# Patient Record
(demographics unavailable — no encounter records)

---

## 2018-02-01 NOTE — PCM.PREANE
Preanesthetic Assessment





- Anesthesia/Transfusion/Family Hx


Anesthesia History: Prior Anesthesia Without Reaction


Family History of Anesthesia Reaction: No (nausea after T&A as a child)


Transfusion History: No Prior Transfusion(s)





- Review of Systems


General: No Symptoms


Pulmonary: No Symptoms


Cardiovascular: No Symptoms


Gastrointestinal: No Symptoms


Neurological: No Symptoms


Other: Reports: None





- Physical Assessment


NPO Status Date: 01/31/18


NPO Status Time: 23:00


O2 Sat by Pulse Oximetry: 97


Respiratory Rate: 16


Vital Signs: 





 Last Vital Signs











Temp  37.5 C   02/01/18 08:09


 


Pulse  84   02/01/18 08:09


 


Resp  16   02/01/18 08:09


 


BP  116/72   02/01/18 08:09


 


Pulse Ox  97   02/01/18 08:09











Height: 1.65 m


Weight: 76.204 kg


ASA Class: 2


Mental Status: Alert & Oriented x3


Airway Class: Mallampati = 1


Dentition: Reports: Normal Dentition


ROM/Head Extension: Full


Lungs: Clear to Auscultation, Normal Respiratory Effort


Cardiovascular: Regular Rate, Regular Rhythm





- Lab


Values: 





 Laboratory Last Values











WBC  7.62 K/uL (4.0-11.0)   01/31/18  09:28    


 


RBC  4.51 M/uL (4.30-5.90)   01/31/18  09:28    


 


Hgb  13.8 g/dL (12.0-16.0)   01/31/18  09:28    


 


Hct  40.3 % (36.0-46.0)   01/31/18  09:28    


 


MCV  89.4 fL (80.0-98.0)   01/31/18  09:28    


 


MCH  30.6 pg (27.0-32.0)   01/31/18  09:28    


 


MCHC  34.2 g/dL (31.0-37.0)   01/31/18  09:28    


 


RDW Std Deviation  43.2 fl (28.0-62.0)   01/31/18  09:28    


 


RDW Coeff of Clau  13 % (11.0-15.0)   01/31/18  09:28    


 


Plt Count  239 K/uL (150-400)   01/31/18  09:28    


 


MPV  10.10 fL (7.40-12.00)   01/31/18  09:28    


 


Nucleated RBC %  0.0 /100WBC  01/31/18  09:28    


 


Nucleated RBCs #  0 K/uL  01/31/18  09:28    


 


Sodium  139 mmol/L (136-146)   01/31/18  09:28    


 


Potassium  4.0 mmol/L (3.5-5.1)   01/31/18  09:28    


 


Chloride  105 mmol/L ()   01/31/18  09:28    


 


Carbon Dioxide  26 mmol/L (21-31)   01/31/18  09:28    


 


BUN  10 mg/dL (6.0-23.0)   01/31/18  09:28    


 


Creatinine  0.7 mg/dL (0.6-1.5)   01/31/18  09:28    


 


Est Cr Clr Drug Dosing  113.43 mL/min  01/31/18  09:28    


 


Estimated GFR (MDRD)  > 60.0 ml/min  01/31/18  09:28    


 


Glucose  94 mg/dL ()   01/31/18  09:28    


 


Calcium  9.3 mg/dL (8.8-10.8)   01/31/18  09:28    


 


HCG, Qual  NEGATIVE  (NEG)   01/31/18  09:28    


 


Blood Type  O POSITIVE   01/31/18  09:28    


 


Antibody Screen  NEGATIVE   01/31/18  09:28    














- Allergies


Allergies/Adverse Reactions: 


 Allergies











Allergy/AdvReac Type Severity Reaction Status Date / Time


 


Penicillins Allergy  throat Verified 01/29/18 13:17





   closes  














- Acknowledgements


Anesthesia Type Planned: General Anesthesia


Pt an Appropriate Candidate for the Planned Anesthesia: Yes


Alternatives and Risks of Anesthesia Discussed w Pt/Guardian: Yes


Pt/Guardian Understands and Agrees with Anesthesia Plan: Yes





PreAnesthesia Questionnaire


HEENT History: Reports: Other (See Below)


Other HEENT History: wears glasses/contacts


Musculoskeletal History: Reports: Fracture


Other Musculoskeletal History: hx fx toes, fingers, wrist, and nose


Neurological History: Reports: Migraines


Psychiatric History: Reports: Anxiety, Depression





- Past Surgical History


Head Surgeries/Procedures: Reports: None


HEENT Surgical History: Reports: Oral Surgery, Tonsillectomy





- SUBSTANCE USE


Smoking Status *Q: Never Smoker


Recreational Drug Use History: No





- HOME MEDS


Home Medications: 


 Home Meds





FLUoxetine HCl [Prozac] 1 tab PO DAILY 01/29/18 [History]


Iron 45 mg PO DAILY 01/29/18 [History]











- CURRENT (IN HOUSE) MEDS


Current Meds: 





 Current Medications





Fentanyl (Sublimaze)  50 mcg IVPUSH Q5M PRN


   PRN Reason: Pain (severe 7-10)


   Stop: 02/02/18 07:47


Lactated Ringer's (Ringers, Lactated)  1,000 mls @ 125 mls/hr IV ASDIRECTED AINSLEY


   Last Admin: 02/01/18 08:11 Dose:  125 mls/hr


Sodium Chloride (Saline Flush)  10 ml FLUSH ASDIRECTED PRN


   PRN Reason: Keep Vein Open


Sodium Chloride (Saline Flush)  2.5 ml FLUSH ASDIRECTED PRN


   PRN Reason: Keep Vein Open





Discontinued Medications





Fentanyl (Sublimaze) Confirm Administered Dose 100 mcg .ROUTE .STK-MED ONE


   Stop: 02/01/18 07:35


Lidocaine (Xylocaine-Mpf 2%) Confirm Administered Dose 5 ml .ROUTE .STK-MED ONE


   Stop: 02/01/18 07:35


Midazolam HCl (Versed 1 Mg/Ml) Confirm Administered Dose 2 mg .ROUTE .STK-MED 

ONE


   Stop: 02/01/18 07:35


Ondansetron HCl (Zofran) Confirm Administered Dose 4 mg .ROUTE .STK-MED ONE


   Stop: 02/01/18 07:35


Propofol (Diprivan  20 Ml) Confirm Administered Dose 200 mg .ROUTE .STK-MED ONE


   Stop: 02/01/18 07:35

## 2018-02-01 NOTE — PCM.OPNOTE
- General Post-Op/Procedure Note


Date of Surgery/Procedure: 02/01/18


Operative Procedure(s): Dignostic Hystroscopy removal of iud


Pre Op Diagnosis: Lost IUD


Post-Op Diagnosis: Same


Anesthesia Technique: General Mask


Primary Surgeon: Damian Montes


Assistant: Kimberly Davis


EBL in mLs: 10


Complications: None


Condition: Good

## 2018-02-01 NOTE — OR
SURGEON:

Damian Montes MD

 

DATE OF PROCEDURE:

 

PREOPERATIVE DIAGNOSES:

Lost Mirena IUD.

 

POSTOPERATIVE DIAGNOSIS:

Lost Mirena IUD.

 

OPERATIONS PERFORMED:

Diagnostic hysteroscopy, removal of Mirena IUD.

 

ASSISTANT:

KIRBY Del Rosario

 

ANESTHESIA:

General mask, Mr. José Miguel Ayon.

 

ESTIMATED BLOOD LOSS:

Less than 10 mL.

 

COMPLICATIONS:

None.

 

FINDINGS:

An IUD in the uterus.

 

INDICATION FOR SURGERY:

This patient is 22, she had Mirena IUD.  She wanted it to be removed.  The

strings were lost.  She has not felt the strings in three years and I attempted

to remove within the office, it was not successful, so the patient is admitted

for diagnostic hysteroscopy and removal of the IUD.

 

PROCEDURE IN DETAIL:

The patient was brought to the OR and after adequate level of mask anesthesia,

the patient was dilated to accommodate a small hysteroscope and then

hysteroscopy was performed.  The endometrial cavity was essentially normal, and

the IUD is seen visible in the endometrial cavity with the strings wrapped

around the body of the IUD, so with using grasper through the hysteroscope, the

IUD is grasped and removed without any problem.  Once we did that, then we ended

the procedure.  Instrument and sponge count was correct.  The patient tolerated

the procedure well, went to recovery room in stable general condition.

 

 

ANATOLY / CLINT

DD:  02/01/2018 10:23:36

DT:  02/01/2018 17:12:43

Job #:  [id number]

## 2018-02-01 NOTE — PCM.DCSUM1
**Discharge Summary





- Discharge Data


Discharge Date: 02/01/18


Discharge Disposition: Home, Self-Care 01


Condition: Good





- Patient Summary/Data


Operative Procedure(s) Performed: Dignostic Hystroscopy removal of iud





- Patient Instructions


Diet: Usual Diet as Tolerated


Activity: As Tolerated


Driving: Do Not Drive


Showering/Bathing: May Shower





- Discharge Plan


Home Medications: 


 Home Meds





FLUoxetine HCl [Prozac] 1 tab PO DAILY 01/29/18 [History]


Iron 45 mg PO DAILY 01/29/18 [History]











- General Info


Date of Service: 02/01/18


Functional Status: Reports: Pain Controlled





- Review of Systems


General: Reports: No Symptoms


HEENT: Reports: No Symptoms


Pulmonary: Reports: No Symptoms


Cardiovascular: Reports: No Symptoms


Gastrointestinal: Reports: No Symptoms


Genitourinary: Reports: No Symptoms


Musculoskeletal: Reports: No Symptoms


Skin: Reports: No Symptoms


Neurological: Reports: No Symptoms


Psychiatric: Reports: No Symptoms





- Patient Data


Vitals - Most Recent: 


 Last Vital Signs











Temp  37.5 C   02/01/18 08:09


 


Pulse  84   02/01/18 08:09


 


Resp  16   02/01/18 08:41


 


BP  116/72   02/01/18 08:09


 


Pulse Ox  97   02/01/18 08:41











Weight - Most Recent: 76.204 kg


Lab Results - Last 24 hrs: 


 Laboratory Results - last 24 hr











  01/31/18 Range/Units





  09:28 


 


Blood Type  O POSITIVE  


 


Antibody Screen  NEGATIVE  











Med Orders - Current: 


 Current Medications





Fentanyl (Sublimaze)  50 mcg IVPUSH Q5M PRN


   PRN Reason: Pain (severe 7-10)


   Stop: 02/02/18 07:47


Lactated Ringer's (Ringers, Lactated)  1,000 mls @ 125 mls/hr IV ASDIRECTED AINSLEY


   Last Admin: 02/01/18 08:11 Dose:  125 mls/hr


Ketorolac Tromethamine (Toradol)  30 mg IVPUSH ONETIME ONE


   Stop: 02/01/18 10:16


Ketorolac Tromethamine (Toradol)  30 mg IVPUSH Q6H PRN


   PRN Reason: Pain (severe 7-10)


   Stop: 02/06/18 10:15


Morphine Sulfate (Morphine)  2 mg IVPUSH Q2H PRN


   PRN Reason: Pain (severe 7-10)


Morphine Sulfate (Morphine)  4 mg IVPUSH Q2H PRN


   PRN Reason: Pain (severe 7-10)


Ondansetron HCl (Zofran)  4 mg IVPUSH Q6H PRN


   PRN Reason: Nausea/Vomiting


Oxycodone/Acetaminophen (Percocet 325-5 Mg)  1 tab PO Q4H PRN


   PRN Reason: Pain (moderate 4-6)


Oxycodone/Acetaminophen (Percocet 325-5 Mg)  2 tab PO Q4H PRN


   PRN Reason: Pain (moderate 4-6)


Promethazine HCl (Phenergan)  25 mg IM Q6H PRN


   PRN Reason: Nausea/Vomiting


Sodium Chloride (Saline Flush)  10 ml FLUSH ASDIRECTED PRN


   PRN Reason: Keep Vein Open


Sodium Chloride (Saline Flush)  2.5 ml FLUSH ASDIRECTED PRN


   PRN Reason: Keep Vein Open





Discontinued Medications





Fentanyl (Sublimaze) Confirm Administered Dose 100 mcg .ROUTE .STK-MED ONE


   Stop: 02/01/18 07:35


Lidocaine (Xylocaine-Mpf 2%) Confirm Administered Dose 5 ml .ROUTE .STK-MED ONE


   Stop: 02/01/18 07:35


Midazolam HCl (Versed 1 Mg/Ml) Confirm Administered Dose 2 mg .ROUTE .STK-MED 

ONE


   Stop: 02/01/18 07:35


Ondansetron HCl (Zofran) Confirm Administered Dose 4 mg .ROUTE .STK-MED ONE


   Stop: 02/01/18 07:35


Propofol (Diprivan  20 Ml) Confirm Administered Dose 200 mg .ROUTE .STK-MED ONE


   Stop: 02/01/18 07:35











- Exam


General: Reports: Alert, Oriented


HEENT: Reports: Pupils Equal, Pupils Reactive, EOMI, Mucous Membr. Moist/Pink


Neck: Reports: Supple


Lungs: Reports: Clear to Auscultation, Normal Respiratory Effort


Cardiovascular: Reports: Regular Rate, Regular Rhythm


GI/Abdominal Exam: Normal Bowel Sounds, Soft, Non-Tender, No Organomegaly, No 

Distention, No Abnormal Bruit, No Mass, Pelvis Stable


 (Female) Exam: Normal External Exam, Normal Speculum Exam, Normal Bimanual 

Exam


Rectal (Female) Exam: Normal Exam, Normal Rectal Tone


Back Exam: Reports: Normal Inspection, Full Range of Motion


Extremities: Normal Inspection, Normal Range of Motion, Non-Tender, No Pedal 

Edema, Normal Capillary Refill


Skin: Reports: Warm, Dry, Intact


Wound/Incisions: Reports: Healing Well


Neurological: Reports: No New Focal Deficit


Psy/Mental Status: Reports: Alert, Normal Affect, Normal Mood





*Q Meaningful Use (DIS)





- VTE *Q


VTE Criteria *Q: 








- Stroke *Q


Stroke Criteria *Q: 








- AMI *Q


AMI Criteria *Q: